# Patient Record
Sex: FEMALE | Race: WHITE
[De-identification: names, ages, dates, MRNs, and addresses within clinical notes are randomized per-mention and may not be internally consistent; named-entity substitution may affect disease eponyms.]

---

## 2018-10-30 ENCOUNTER — HOSPITAL ENCOUNTER (OUTPATIENT)
Dept: HOSPITAL 25 - OREAST | Age: 83
Discharge: HOME | End: 2018-10-30
Attending: OPHTHALMOLOGY
Payer: MEDICARE

## 2018-10-30 VITALS — SYSTOLIC BLOOD PRESSURE: 149 MMHG | DIASTOLIC BLOOD PRESSURE: 78 MMHG

## 2018-10-30 DIAGNOSIS — I25.119: ICD-10-CM

## 2018-10-30 DIAGNOSIS — H25.12: ICD-10-CM

## 2018-10-30 DIAGNOSIS — Z01.818: Primary | ICD-10-CM

## 2018-10-30 DIAGNOSIS — Z79.82: ICD-10-CM

## 2018-10-30 DIAGNOSIS — I10: ICD-10-CM

## 2018-10-30 DIAGNOSIS — M48.061: ICD-10-CM

## 2018-10-30 DIAGNOSIS — Z87.891: ICD-10-CM

## 2018-10-30 PROCEDURE — V2632 POST CHMBR INTRAOCULAR LENS: HCPCS

## 2018-10-30 NOTE — OP
DATE OF OPERATION:  10/30/18 [facility]

 

DATE OF BIRTH:  [birth year]

 

SURGEON:  Dr. Tj Colbert.

 

ASSISTANT:  None.

 

ANESTHESIA:  Topical with intravenous sedation.

 

PRE-OP DIAGNOSIS:  Cataract, left eye.

 

POST-OP DIAGNOSIS:  Cataract, left eye.

 

OPERATIVE PROCEDURE:  Phacoemulsification and cataract extraction with 
posterior chamber intraocular lens implant, left eye.

 

COMPLICATIONS:  None.

 

BLOOD LOSS:  None.

 

DESCRIPTION OF PROCEDURE:  The patient was brought to the operating room and 
received a small amount of intravenous sedation.  A drop of Tetracaine was 
placed in her left eye.  She was prepped and draped in the usual sterile 
fashion for ophthalmic surgery and attention was directed to the left eye where 
a speculum was placed.  A paracentesis was created at the 5 o'clock position 
and 0.1 cc of 1 percent preservative-free Lidocaine was injected into the 
anterior chamber followed by DisCoVisc.  The eye was digitally stabilized while 
a 2.75 mm keratome was used to create a triplanar clear corneal incision at the 
3 o'clock position.  A continuous curvilinear capsulorrhexis was created with a 
cystotome and Utrata forceps.  BSS on a cannula was used to hydrodissect the 
lens from the capsule.  Phacoemulsification was performed in a divide-and-
conquer technique to create four fragments which were removed.  Residual 
cortical material was removed with irrigation and aspiration. DisCoVisc was 
used to inflate the capsular bag and an AU00T0 19.5 diopter lens was folded and 
inserted into the capsular bag.  DisCoVisc was removed using irrigation and 
aspiration.  BSS on a cannula was used to hydrate the corneal stroma and seal 
the wound.  At the end of the case the pupil was round and the lens was 
centered. The eye was of normal pressure and the wound was water tight.  The 
speculum was removed and topical Maxitrol ointment was placed on the surface of 
the eye. The eye was closed, patched and shielded and the patient was sent to 
the recovery room in stable condition with post operative instructions and 
follow-up appointment given.

 

 [id number]/CPS #: [id number]

ELSA

## 2018-11-06 ENCOUNTER — HOSPITAL ENCOUNTER (OUTPATIENT)
Dept: HOSPITAL 25 - OREAST | Age: 83
Discharge: HOME | End: 2018-11-06
Attending: OPHTHALMOLOGY
Payer: MEDICARE

## 2018-11-06 VITALS — SYSTOLIC BLOOD PRESSURE: 152 MMHG | DIASTOLIC BLOOD PRESSURE: 74 MMHG

## 2018-11-06 DIAGNOSIS — Z87.891: ICD-10-CM

## 2018-11-06 DIAGNOSIS — H25.11: Primary | ICD-10-CM

## 2018-11-06 DIAGNOSIS — M48.00: ICD-10-CM

## 2018-11-06 DIAGNOSIS — I25.119: ICD-10-CM

## 2018-11-06 DIAGNOSIS — I10: ICD-10-CM

## 2018-11-06 PROCEDURE — C9447 INJ, PHENYLEPHRINE KETOROLAC: HCPCS

## 2018-11-06 PROCEDURE — V2632 POST CHMBR INTRAOCULAR LENS: HCPCS

## 2018-11-06 NOTE — OP
DATE OF OPERATION/DATE OF DICTATION:  11/06/2018 - [facility]

 

YOB: 1933.

 

SURGEON:  Dr. Tj Colbert.

 

ASSISTANT:  None.

 

ANESTHESIA:  Topical with intravenous sedation.

 

PRE-OP DIAGNOSIS:  Cataract, right eye.

 

POST-OP DIAGNOSIS:  Cataract, right eye.

 

OPERATIVE PROCEDURE: Phacoemulsification and cataract extraction with posterior 
chamber intraocular lens implant, right eye.

 

COMPLICATIONS:  None.

 

BLOOD LOSS:  None.

 

DESCRIPTION OF PROCEDURE:  The patient was brought to the operating room and 
received a small amount of intravenous sedation.  A drop of Tetracaine was 
placed in her right eye.  She was prepped and draped in the usual sterile 
fashion for ophthalmic surgery and attention was directed to the right eye 
where a speculum was placed.  A paracentesis was created at the 11 o'clock 
position and 0.1 cc of 1 percent preservative-free Lidocaine was injected into 
the anterior chamber followed by DisCoVisc.  The eye was digitally stabilized 
while a 2.75 mm keratome was used to create a triplanar clear corneal incision 
at the 9 o'clock position.  A continuous curvilinear capsulorrhexis was created 
with a cystotome and Utrata forceps.  BSS on a cannula was used to hydrodissect 
the lens from the capsule.  Phacoemulsification was performed in a divide-and-
conquer technique to create four fragments which were removed.  Residual 
cortical material was removed with irrigation and aspiration. DisCoVisc was 
used to inflate the capsular bag and an AUOOTO 19.0 diopter lens was folded and 
inserted into the capsular bag.  DisCoVisc was removed using irrigation and 
aspiration.  BSS on a cannula was used to hydrate the corneal stroma and seal 
the wound.  At the end of the case the pupil was round and the lens was 
centered. The eye was of normal pressure and the wound was water tight.  The 
speculum was removed and topical Maxitrol ointment was placed on the surface of 
the eye. The eye was closed, patched and shielded and the patient was sent to 
the recovery room in stable condition with post operative instructions and 
follow-up appointment given.

 

 [id number]/CPS #: [id number]

MTDD